# Patient Record
(demographics unavailable — no encounter records)

---

## 2025-03-11 NOTE — REASON FOR VISIT
[Follow-Up] : a follow-up visit for [Mother] : mother [Patient] : patient [FreeTextEntry3] : HX: Total anomalous pulmonary venous connection, atrial septal defect,  ostium secundum

## 2025-03-11 NOTE — PHYSICAL EXAM
[General Appearance - Alert] : alert [General Appearance - In No Acute Distress] : in no acute distress [General Appearance - Well Nourished] : well nourished [General Appearance - Well Developed] : well developed [General Appearance - Well-Appearing] : well appearing [Appearance Of Head] : the head was normocephalic [Facies] : there were no dysmorphic facial features [Sclera] : the conjunctiva were normal [Outer Ear] : the ears and nose were normal in appearance [Examination Of The Oral Cavity] : mucous membranes were moist and pink [Auscultation Breath Sounds / Voice Sounds] : breath sounds clear to auscultation bilaterally [Normal Chest Appearance] : the chest was normal in appearance [Chest Surgical / Traumatic Scar] : chest incision well healed [No Sternal Instability] : no sternal instability [Apical Impulse] : quiet precordium with normal apical impulse [Heart Rate And Rhythm] : normal heart rate and rhythm [Heart Sounds] : normal S1 and S2 [No Murmur] : no murmurs  [Heart Sounds Gallop] : no gallops [Heart Sounds Pericardial Friction Rub] : no pericardial rub [Edema] : no edema [Arterial Pulses] : normal upper and lower extremity pulses with no pulse delay [Heart Sounds Click] : no clicks [Capillary Refill Test] : normal capillary refill [Bowel Sounds] : normal bowel sounds [Abdomen Soft] : soft [Nondistended] : nondistended [Abdomen Tenderness] : non-tender [Nail Clubbing] : no clubbing  or cyanosis of the fingers [Motor Tone] : normal muscle strength and tone [] : no rash [Skin Lesions] : no lesions [Skin Turgor] : normal turgor [Demonstrated Behavior - Infant Nonreactive To Parents] : interactive [Mood] : mood and affect were appropriate for age [Demonstrated Behavior] : normal behavior

## 2025-03-11 NOTE — CONSULT LETTER
[Today's Date] : [unfilled] [Name] : Name: [unfilled] [] : : ~~ [Today's Date:] : [unfilled] [Dear  ___:] : Dear Dr. [unfilled]: [Consult] : I had the pleasure of evaluating your patient, [unfilled]. My full evaluation follows. [Consult - Single Provider] : Thank you very much for allowing me to participate in the care of this patient. If you have any questions, please do not hesitate to contact me. [Sincerely,] : Sincerely, [FreeTextEntry4] : Harley Celeste D.O. [FreeTextEntry5] : [address] [FreeTextEntry6] : [place] [address] [de-identified] : EDER Cordova, FAAP, FSCMR  of Pediatrics [facility] [employer] [facility]	 Attending Physician, Pediatric Cardiology Director, Pediatric and Adult Congenital Cardiac MRI [facility]

## 2025-03-11 NOTE — CONSULT LETTER
Humidifier  Push clear fluids  Saline nasal rinse  Complete antibiotic  Follow up with your primary care physician  Go to the ER with any worsening symptoms  Try an over the counter antihistamine of choice   [Today's Date] : [unfilled] [Name] : Name: [unfilled] [] : : ~~ [Today's Date:] : [unfilled] [Dear  ___:] : Dear Dr. [unfilled]: [Consult] : I had the pleasure of evaluating your patient, [unfilled]. My full evaluation follows. [Consult - Single Provider] : Thank you very much for allowing me to participate in the care of this patient. If you have any questions, please do not hesitate to contact me. [Sincerely,] : Sincerely, [FreeTextEntry4] : Harley Celeste D.O. [FreeTextEntry5] : [address] [FreeTextEntry6] : [place] [address] [de-identified] : EDER Cordova, FAAP, FSCMR  of Pediatrics [facility] [employer] [facility] at [facility]	 Attending Physician, Pediatric Cardiology Director, Pediatric and Adult Congenital Cardiac MRI [facility]